# Patient Record
Sex: MALE | Race: BLACK OR AFRICAN AMERICAN | URBAN - METROPOLITAN AREA
[De-identification: names, ages, dates, MRNs, and addresses within clinical notes are randomized per-mention and may not be internally consistent; named-entity substitution may affect disease eponyms.]

---

## 2020-05-20 ENCOUNTER — TELEPHONE (OUTPATIENT)
Dept: NEUROSURGERY | Facility: CLINIC | Age: 65
End: 2020-05-20

## 2020-05-20 NOTE — TELEPHONE ENCOUNTER
----- Message from Kristin Godoy sent at 5/20/2020 11:16 AM CDT -----  Contact:  from [facility]  Gen will be faxing the MRI results, the referral for his appt, and the authorization for the appt.  Please call [phone] if these items are not received.

## 2020-05-25 NOTE — TELEPHONE ENCOUNTER
Left message to contact clinic re: upcoming appt with MD. We need copy of MRI disc.       Left message w/ Gen with [facility] re: MRI disc is needed prior to appointment with MD.

## 2020-07-07 ENCOUNTER — TELEPHONE (OUTPATIENT)
Dept: NEUROSURGERY | Facility: CLINIC | Age: 65
End: 2020-07-07

## 2020-07-07 NOTE — TELEPHONE ENCOUNTER
Spoke to the patient regarding having a current MRI. Instructed Dr Spencer requires his patient to have the MRI sent to the office 3 days prior to their appointment to have it uploaded into our system. Patient states he is a  and this is not what his congressmen and senator tell him. [facility] stated [facility]. Instructed this is not our policy. He states he would have his senator and congressmen call us because this is not something he should have to do, he is a busy man and I am asking him to take his time to do this. I then instructed I could have the  him to discuss this. He that states he is a  and this is not something he should have to do. I instructed if the disc is not uploaded prior to his appointment we would need to cancel and reschedule him. He says I am threatening him and starts cursing at me. Instructed him I would not stay on the phone with him to have a nice day.